# Patient Record
Sex: MALE | Race: WHITE | HISPANIC OR LATINO
[De-identification: names, ages, dates, MRNs, and addresses within clinical notes are randomized per-mention and may not be internally consistent; named-entity substitution may affect disease eponyms.]

---

## 2017-08-12 ENCOUNTER — APPOINTMENT (OUTPATIENT)
Dept: DERMATOLOGY | Facility: CLINIC | Age: 22
End: 2017-08-12

## 2017-08-28 ENCOUNTER — APPOINTMENT (OUTPATIENT)
Dept: DERMATOLOGY | Facility: CLINIC | Age: 22
End: 2017-08-28

## 2018-08-23 ENCOUNTER — TRANSCRIPTION ENCOUNTER (OUTPATIENT)
Age: 23
End: 2018-08-23

## 2019-10-24 ENCOUNTER — TRANSCRIPTION ENCOUNTER (OUTPATIENT)
Age: 24
End: 2019-10-24

## 2022-04-14 ENCOUNTER — NON-APPOINTMENT (OUTPATIENT)
Age: 27
End: 2022-04-14

## 2022-04-14 ENCOUNTER — APPOINTMENT (OUTPATIENT)
Dept: COLORECTAL SURGERY | Facility: CLINIC | Age: 27
End: 2022-04-14
Payer: MEDICAID

## 2022-04-14 VITALS
BODY MASS INDEX: 30.31 KG/M2 | SYSTOLIC BLOOD PRESSURE: 156 MMHG | HEART RATE: 76 BPM | RESPIRATION RATE: 15 BRPM | HEIGHT: 68 IN | DIASTOLIC BLOOD PRESSURE: 86 MMHG | TEMPERATURE: 97 F | WEIGHT: 200 LBS

## 2022-04-14 DIAGNOSIS — K64.5 PERIANAL VENOUS THROMBOSIS: ICD-10-CM

## 2022-04-14 PROCEDURE — 99204 OFFICE O/P NEW MOD 45 MIN: CPT | Mod: 25

## 2022-04-14 PROCEDURE — 46600 DIAGNOSTIC ANOSCOPY SPX: CPT

## 2022-04-14 NOTE — CONSULT LETTER
[Dear  ___] : Dear  [unfilled], [Consult Letter:] : I had the pleasure of evaluating your patient, [unfilled]. [Please see my note below.] : Please see my note below. [Consult Closing:] : Thank you very much for allowing me to participate in the care of this patient.  If you have any questions, please do not hesitate to contact me. [Sincerely,] : Sincerely, [FreeTextEntry2] : JUSTINE ROLDAN [FreeTextEntry3] : Omar Kaur MD\par Colon and Rectal Surgery\par

## 2022-04-14 NOTE — PHYSICAL EXAM
[Excoriation] : no perianal excoriation [Multiple Sinus Tracts] : no perianal sinus tracts [Fistula] : no fistulas [Wart] : no warts [Pilonidal Cyst] : no pilonidal cysts [Pilonidal Sinus] : no pilonidal sinus [Tender, Swollen] : nontender, non-swollen [Thrombosed] : that was thrombosed [Normal] : was normal [None] : there was no rectal mass  [Alert] : alert [Oriented to Person] : oriented to person [Oriented to Place] : oriented to place [Oriented to Time] : oriented to time [Calm] : calm [de-identified] : Soft, non-tender, non-distended [de-identified] : MARTINE: non-tender, visually identified and palpable thrombosed external hemorrhoids approximate 4mm in size. One located right anterior and one left lateral [de-identified] : Small resolving thrombosed external hemorrhoids x 2 [de-identified] : 2 very small resolving external thrombosed hemorrhoids [de-identified] : NAD [de-identified] : Non-labored respiration [de-identified] : Normal rate

## 2022-04-14 NOTE — HISTORY OF PRESENT ILLNESS
[FreeTextEntry1] : 27 year old male with no significant PMH who presents for evaluation of lump noticed 4 days ago. Patient reports waking up on Monday and noticing slight discomfort and a small lump at his anus. Patient reports the prior day he had multiple episodes of loose stools likely due to bad food. Patient states that the lump has gotten much smaller over the ensuing days. Patient denies any blood per rectum. He does report one incident in the past where he noticed a small amount of blood on wiping. No other symptoms reported. No personal of family history of UC, Crohns or colon cancer.

## 2022-04-14 NOTE — PROCEDURE
[FreeTextEntry1] : Anoscopy\par \par I discussed the reason for the procedure, and the risks and benefits with the patient. Risks including bleeding and discomfort were discussed. The patient understood or discussion and would like to proceed with the procedure.\par \par After completing a digital rectal exam a well lubricated anoscope was gently inserted into the anus. Complete inspection was performed. No tenderness on insertion of the anoscope, and the procedure was tolerated well. No fissures, fistula openings, enlarged hemorrhoids or masses were identified.\par \par Findings: small resolving thrombosed external hemorrhoids. Right anterior x 1, left lateral x 1

## 2022-04-14 NOTE — ASSESSMENT
[FreeTextEntry1] : 27 year old male with 2 small resolving thrombosed external hemorrhoids, right anterior and left lateral. Patient reports symptoms have improved over the past 3 days he he currently does not have any discomfort. Will continue with conservative management for resolving thrombosed hemorrhoids.\par \par Plan of care for Hemorrhoids\par Add daily fiber supplementation to diet, Metamucil, Benefiber, or fiber supplement of preference\par Daily over the counter stool softener (eg.Colace) to prevent straining\par Increased fluid intake, preferably water\par Refrain from straining or lingering (eg. reading) on the toilet\par Warm sitz bath after bowel movements, no more than 3/day, do not exceed 10 minutes per sitz bath\par Patient will follow up in 4 weeks

## 2025-07-10 ENCOUNTER — NON-APPOINTMENT (OUTPATIENT)
Age: 30
End: 2025-07-10

## 2025-07-17 ENCOUNTER — NON-APPOINTMENT (OUTPATIENT)
Age: 30
End: 2025-07-17